# Patient Record
(demographics unavailable — no encounter records)

---

## 2025-01-09 NOTE — ASSESSMENT
What Type Of Note Output Would You Prefer (Optional)?: Standard Output [FreeTextEntry1] : Is a self-referred gentleman for evaluation of a right inguinal hernia.  Patient states he thinks he has a hernia because he has a slight protrusion in the right groin that he was talking about his issues with his aunt and his aunt says that prior hernia since she had similar issues.  He states he has occasional pain in the right groin there is no true aggravating or relieving factors but he notices when he eats a little bit more there is more discomfort associated on the right side.  He has had no nausea no vomiting no change in bowel habits.  Patient denies previous abdominal surgeries.  Physical examination patient examined erect and supine position.  The abdomen soft nontender nondistended he has a small right inguinal hernia that is reducible the right scrotum and testicles within normal limits on the left side the patient is noted to have some laxity but no obvious hernia left scrotum and testicles within normal limits.  Impression/plan right inguinal hernia: This is a 22-year-old gentleman with a slight protrusion in the right groin associated with a right inguinal hernia.  He and I had a long conversation regarding whether or not to repair it at this time given the size is very small and the patient wants to have his hernia repaired.  Statistically he may have a hernia on the other side therefore at the time of surgery laparoscopically I will examine the contralateral side, for hernias identified will be repaired at the same setting.  The indications alternatives and complication of procedure discussed questions answered written consent obtained the office today. How Severe Is Your Rash?: moderate Is This A New Presentation, Or A Follow-Up?: Rash